# Patient Record
Sex: MALE | URBAN - METROPOLITAN AREA
[De-identification: names, ages, dates, MRNs, and addresses within clinical notes are randomized per-mention and may not be internally consistent; named-entity substitution may affect disease eponyms.]

---

## 2017-05-23 ENCOUNTER — APPOINTMENT (OUTPATIENT)
Dept: GENERAL RADIOLOGY | Facility: CLINIC | Age: 57
End: 2017-05-23
Attending: PHYSICIAN ASSISTANT
Payer: COMMERCIAL

## 2017-05-23 ENCOUNTER — HOSPITAL ENCOUNTER (EMERGENCY)
Facility: CLINIC | Age: 57
Discharge: HOME | End: 2017-05-23
Attending: PHYSICIAN ASSISTANT | Admitting: PHYSICIAN ASSISTANT
Payer: COMMERCIAL

## 2017-05-23 VITALS
BODY MASS INDEX: 24.8 KG/M2 | WEIGHT: 140 LBS | RESPIRATION RATE: 18 BRPM | TEMPERATURE: 98.8 F | HEART RATE: 93 BPM | SYSTOLIC BLOOD PRESSURE: 126 MMHG | HEIGHT: 63 IN | OXYGEN SATURATION: 97 % | DIASTOLIC BLOOD PRESSURE: 82 MMHG

## 2017-05-23 DIAGNOSIS — R91.8 OPACITY OF LUNG ON IMAGING STUDY: ICD-10-CM

## 2017-05-23 DIAGNOSIS — J18.9 PNEUMONIA OF LEFT LOWER LOBE DUE TO INFECTIOUS ORGANISM: ICD-10-CM

## 2017-05-23 DIAGNOSIS — J90 PLEURAL EFFUSION, LEFT: ICD-10-CM

## 2017-05-23 DIAGNOSIS — R79.89 ELEVATED LIVER FUNCTION TESTS: ICD-10-CM

## 2017-05-23 LAB
ALBUMIN SERPL-MCNC: 2.6 G/DL (ref 3.4–5)
ALBUMIN UR-MCNC: NEGATIVE MG/DL
ALP SERPL-CCNC: 104 U/L (ref 40–150)
ALT SERPL W P-5'-P-CCNC: 74 U/L (ref 0–70)
ANION GAP SERPL CALCULATED.3IONS-SCNC: 7 MMOL/L (ref 3–14)
APPEARANCE UR: CLEAR
AST SERPL W P-5'-P-CCNC: 57 U/L (ref 0–45)
BASOPHILS # BLD AUTO: 0.1 10E9/L (ref 0–0.2)
BASOPHILS NFR BLD AUTO: 0.4 %
BILIRUB SERPL-MCNC: 0.5 MG/DL (ref 0.2–1.3)
BILIRUB UR QL STRIP: NEGATIVE
BUN SERPL-MCNC: 13 MG/DL (ref 7–30)
CALCIUM SERPL-MCNC: 8.7 MG/DL (ref 8.5–10.1)
CHLORIDE SERPL-SCNC: 104 MMOL/L (ref 94–109)
CO2 SERPL-SCNC: 28 MMOL/L (ref 20–32)
COLOR UR AUTO: YELLOW
CREAT SERPL-MCNC: 0.82 MG/DL (ref 0.66–1.25)
DIFFERENTIAL METHOD BLD: ABNORMAL
EOSINOPHIL # BLD AUTO: 0.3 10E9/L (ref 0–0.7)
EOSINOPHIL NFR BLD AUTO: 1.9 %
ERYTHROCYTE [DISTWIDTH] IN BLOOD BY AUTOMATED COUNT: 13.2 % (ref 10–15)
GFR SERPL CREATININE-BSD FRML MDRD: ABNORMAL ML/MIN/1.7M2
GLUCOSE SERPL-MCNC: 114 MG/DL (ref 70–99)
GLUCOSE UR STRIP-MCNC: NEGATIVE MG/DL
HCT VFR BLD AUTO: 35.4 % (ref 40–53)
HGB BLD-MCNC: 11.7 G/DL (ref 13.3–17.7)
HGB UR QL STRIP: NEGATIVE
IMM GRANULOCYTES # BLD: 0.1 10E9/L (ref 0–0.4)
IMM GRANULOCYTES NFR BLD: 0.6 %
KETONES UR STRIP-MCNC: NEGATIVE MG/DL
LACTATE BLD-SCNC: 0.9 MMOL/L (ref 0.7–2.1)
LEUKOCYTE ESTERASE UR QL STRIP: NEGATIVE
LYMPHOCYTES # BLD AUTO: 1.5 10E9/L (ref 0.8–5.3)
LYMPHOCYTES NFR BLD AUTO: 10.6 %
MAGNESIUM SERPL-MCNC: 2.5 MG/DL (ref 1.6–2.3)
MCH RBC QN AUTO: 30.6 PG (ref 26.5–33)
MCHC RBC AUTO-ENTMCNC: 33.1 G/DL (ref 31.5–36.5)
MCV RBC AUTO: 93 FL (ref 78–100)
MONOCYTES # BLD AUTO: 1 10E9/L (ref 0–1.3)
MONOCYTES NFR BLD AUTO: 7.1 %
MUCOUS THREADS #/AREA URNS LPF: PRESENT /LPF
NEUTROPHILS # BLD AUTO: 11 10E9/L (ref 1.6–8.3)
NEUTROPHILS NFR BLD AUTO: 79.4 %
NITRATE UR QL: NEGATIVE
NRBC # BLD AUTO: 0 10*3/UL
NRBC BLD AUTO-RTO: 0 /100
PH UR STRIP: 6 PH (ref 5–7)
PLATELET # BLD AUTO: 488 10E9/L (ref 150–450)
POTASSIUM SERPL-SCNC: 3.3 MMOL/L (ref 3.4–5.3)
PROT SERPL-MCNC: 7 G/DL (ref 6.8–8.8)
RBC # BLD AUTO: 3.82 10E12/L (ref 4.4–5.9)
RBC #/AREA URNS AUTO: <1 /HPF (ref 0–2)
SODIUM SERPL-SCNC: 139 MMOL/L (ref 133–144)
SP GR UR STRIP: 1.01 (ref 1–1.03)
URN SPEC COLLECT METH UR: ABNORMAL
UROBILINOGEN UR STRIP-MCNC: 2 MG/DL (ref 0–2)
WBC # BLD AUTO: 13.9 10E9/L (ref 4–11)
WBC #/AREA URNS AUTO: 1 /HPF (ref 0–2)

## 2017-05-23 PROCEDURE — 99284 EMERGENCY DEPT VISIT MOD MDM: CPT

## 2017-05-23 PROCEDURE — 25000132 ZZH RX MED GY IP 250 OP 250 PS 637: Performed by: PHYSICIAN ASSISTANT

## 2017-05-23 PROCEDURE — 81001 URINALYSIS AUTO W/SCOPE: CPT | Performed by: PHYSICIAN ASSISTANT

## 2017-05-23 PROCEDURE — 96361 HYDRATE IV INFUSION ADD-ON: CPT

## 2017-05-23 PROCEDURE — 80053 COMPREHEN METABOLIC PANEL: CPT | Performed by: PHYSICIAN ASSISTANT

## 2017-05-23 PROCEDURE — 96360 HYDRATION IV INFUSION INIT: CPT

## 2017-05-23 PROCEDURE — 85025 COMPLETE CBC W/AUTO DIFF WBC: CPT | Performed by: PHYSICIAN ASSISTANT

## 2017-05-23 PROCEDURE — 83735 ASSAY OF MAGNESIUM: CPT | Performed by: PHYSICIAN ASSISTANT

## 2017-05-23 PROCEDURE — 25000128 H RX IP 250 OP 636: Performed by: PHYSICIAN ASSISTANT

## 2017-05-23 PROCEDURE — 87040 BLOOD CULTURE FOR BACTERIA: CPT | Performed by: PHYSICIAN ASSISTANT

## 2017-05-23 PROCEDURE — 71020 XR CHEST 2 VW: CPT

## 2017-05-23 PROCEDURE — 83605 ASSAY OF LACTIC ACID: CPT | Performed by: PHYSICIAN ASSISTANT

## 2017-05-23 RX ORDER — POTASSIUM CHLORIDE 1500 MG/1
20 TABLET, EXTENDED RELEASE ORAL ONCE
Status: COMPLETED | OUTPATIENT
Start: 2017-05-23 | End: 2017-05-23

## 2017-05-23 RX ORDER — AZITHROMYCIN 250 MG/1
TABLET, FILM COATED ORAL
Qty: 6 TABLET | Refills: 0 | Status: SHIPPED | OUTPATIENT
Start: 2017-05-23 | End: 2017-05-28

## 2017-05-23 RX ADMIN — POTASSIUM CHLORIDE 20 MEQ: 1500 TABLET, EXTENDED RELEASE ORAL at 12:16

## 2017-05-23 RX ADMIN — SODIUM CHLORIDE 1000 ML: 9 INJECTION, SOLUTION INTRAVENOUS at 11:09

## 2017-05-23 ASSESSMENT — ENCOUNTER SYMPTOMS
FEVER: 1
SHORTNESS OF BREATH: 1
COUGH: 1
ABDOMINAL PAIN: 0

## 2017-05-23 NOTE — ED AVS SNAPSHOT
[facility] Emergency Department    [address]    [facility] [address]    Phone:  [phone]    Fax:  [phone]                                       Kym Chua   MRN: [id number]    Department:  [facility] Emergency Department   Date of Visit:  5/23/2017           After Visit Summary Signature Page     I have received my discharge instructions, and my questions have been answered. I have discussed any challenges I see with this plan with the nurse or doctor.    ..........................................................................................................................................  Patient/Patient Representative Signature      ..........................................................................................................................................  Patient Representative Print Name and Relationship to Patient    ..................................................               ................................................  Date                                            Time    ..........................................................................................................................................  Reviewed by Signature/Title    ...................................................              ..............................................  Date                                                            Time

## 2017-05-23 NOTE — ED NOTES
Pt educated on Potassium, pt resting in cot, respirations equal and unlabored. Updated on POC. Denies needs

## 2017-05-23 NOTE — ED AVS SNAPSHOT
[facility] Emergency Department    [address]    [facility] [address]    Phone:  [phone]    Fax:  [phone]                                       Kym Chua   MRN: [id number]    Department:  [facility] Emergency Department   Date of Visit:  5/23/2017           Patient Information     Date Of Birth          1960        Your diagnoses for this visit were:     Pneumonia of left lower lobe due to infectious organism     Pleural effusion, left, small     Opacity of lung on imaging study, right lung and left mid lung     Elevated liver function tests        You were seen by Subha Lyn PA-C.      Follow-up Information     Follow up with [facility] Emergency Department.    Specialty:  EMERGENCY MEDICINE    Why:  If symptoms worsen    Contact information:    [address]  [facility] [address] [phone]        Follow up with Yadi Gómez In 2 days.    Specialty:  Family Practice    Contact information:    [facility]  [address] [facility] [address]  [phone]          Discharge Instructions       Rest, switch antibiotic to azithromycin.   Continue tylenol and ibuprofen for pain and fevers.   Follow up in 2 days with primary care to ensure improving.   Return if worsening symptoms, persistent high fevers not relieved by tylenol/ibuprofen, not able to tolerate oral meds, or any other new concerns.     *recheck liver function tests with primary care.   *repeat CXR with primary care provider to monitor opacities in lungs.       Discharge Instructions  Bronchitis, Pneumonia, Bronchospasm    You were seen today for a chest infection or inflammation. If your doctor decided this was due to a bacterial infection, you may need an antibiotic. Sometimes these are caused by a virus, and then an antibiotic will not help.     Return to the Emergency Department if:    Your breathing gets much worse.    You are very  "weak, or feel much more ill.    You develop new symptoms, such as chest pain.    You cough up blood.    You are vomiting enough that you can t keep fluids or your medicine down.    What can I do to help myself?    Fill any prescriptions the doctor gave you and take them right away--especially antibiotics. Be sure to finish the whole antibiotic prescription.    You may be given a prescription for an inhaler, which can help loosen tight air passages.  Use this as needed, but not more often than directed. Inhalers work much better when used with a spacer.     You may be given a prescription for a steroid to reduce inflammation. Used long-term, these can have many serious side effects, but for short courses these do not happen. You may notice restlessness or increased appetite.        You may use non-prescription cough or cold medicines. Cough medicines may help, but don t make the cough go away completely.     Avoid smoke, because this can make your symptoms worse. If you smoke, this may be a good time to quit! Consider using nicotine lozenges, gum, or patches to reduce cravings.     If you have a fever, Tylenol  (acetaminophen), Motrin  (ibuprofen), or Advil  (ibuprofen) may help bring fever down and may help you feel more comfortable. Be sure to read and follow the package directions, and ask your doctor if you have questions.    Be sure to get your flu shot each year.  The pneumonia shot can help prevent pneumonia.  Probiotics: If you have been given an antibiotic, you may want to also take a probiotic pill or eat yogurt with live cultures. Probiotics have \"good bacteria\" to help your intestines stay healthy. Studies have shown that probiotics help prevent diarrhea and other intestine problems (including C. diff infection) when you take antibiotics. You can buy these without a prescription in the pharmacy section of the store.     If your doctor has told you to follow-up at your clinic, be sure to call right away " and go to your appointment.  If there is any problem with keeping your appointment, call your doctor or return to the Emergency Department.    If you were given a prescription for medicine here today, be sure to read all of the information (including the package insert) that comes with your prescription.  This will include important information about the medicine, its side effects, and any warnings that you need to know about.  The pharmacist who fills the prescription can provide more information and answer questions you may have about the medicine.  If you have questions or concerns that the pharmacist cannot address, please call or return to the Emergency Department.       Remember that you can always come back to the Emergency Department if you are not able to see your regular doctor in the amount of time listed above, if you get any new symptoms, or if there is anything that worries you.          24 Hour Appointment Hotline       To make an appointment at any [facility], call [phone] ([phone]). If you don't have a family doctor or clinic, we will help you find one. [facility] are conveniently located to serve the needs of you and your family.             Review of your medicines      START taking        Dose / Directions Last dose taken    azithromycin 250 MG tablet   Commonly known as:  ZITHROMAX Z-JEANNE   Quantity:  6 tablet        Two tablets on the first day, then one tablet daily for the next 4 days   Refills:  0          Our records show that you are taking the medicines listed below. If these are incorrect, please call your family doctor or clinic.        Dose / Directions Last dose taken    CIPRODEX otic suspension   Quantity:  1   Generic drug:  ciprofloxacin-dexamethasone        3 drops twice a day for 7 days   Refills:  0        NAPROSYN 500 MG tablet   Quantity:  60   Generic drug:  naproxen        ONE TABLET TWICE DAILY WITH FOOD   Refills:  0        SUDAFED PO        AS  NEEDED   Refills:  0                Prescriptions were sent or printed at these locations (1 Prescription)                   Other Prescriptions                Printed at Department/Unit printer (1 of 1)         azithromycin (ZITHROMAX Z-JEANNE) 250 MG tablet                Procedures and tests performed during your visit     Blood culture    CBC + differential    Chest XR,  PA & LAT    Comprehensive metabolic panel    IV access    Lactic acid whole blood    Magnesium    UA with Microscopic      Orders Needing Specimen Collection     None      Pending Results     Date and Time Order Name Status Description    5/23/2017 1057 UA with Microscopic In process     5/23/2017 1057 Blood culture In process             Pending Culture Results     Date and Time Order Name Status Description    5/23/2017 1057 UA with Microscopic In process     5/23/2017 1057 Blood culture In process             Pending Results Instructions     If you had any lab results that were not finalized at the time of your Discharge, you can call the ED Lab Result RN at [phone]. You will be contacted by this team for any positive Lab results or changes in treatment. The nurses are available 7 days a week from 10A to 6:30P.  You can leave a message 24 hours per day and they will return your call.        Test Results From Your Hospital Stay        5/23/2017 11:28 AM      Component Results     Component Value Ref Range & Units Status    WBC 13.9 (H) 4.0 - 11.0 10e9/L Final    RBC Count 3.82 (L) 4.4 - 5.9 10e12/L Final    Hemoglobin 11.7 (L) 13.3 - 17.7 g/dL Final    Hematocrit 35.4 (L) 40.0 - 53.0 % Final    MCV 93 78 - 100 fl Final    MCH 30.6 26.5 - 33.0 pg Final    MCHC 33.1 31.5 - 36.5 g/dL Final    RDW 13.2 10.0 - 15.0 % Final    Platelet Count 488 (H) 150 - 450 10e9/L Final    Diff Method Automated Method  Final    % Neutrophils 79.4 % Final    % Lymphocytes 10.6 % Final    % Monocytes 7.1 % Final    % Eosinophils 1.9 % Final    % Basophils 0.4 %  Final    % Immature Granulocytes 0.6 % Final    Nucleated RBCs 0 0 /100 Final    Absolute Neutrophil 11.0 (H) 1.6 - 8.3 10e9/L Final    Absolute Lymphocytes 1.5 0.8 - 5.3 10e9/L Final    Absolute Monocytes 1.0 0.0 - 1.3 10e9/L Final    Absolute Eosinophils 0.3 0.0 - 0.7 10e9/L Final    Absolute Basophils 0.1 0.0 - 0.2 10e9/L Final    Abs Immature Granulocytes 0.1 0 - 0.4 10e9/L Final    Absolute Nucleated RBC 0.0  Final         5/23/2017 11:47 AM      Component Results     Component Value Ref Range & Units Status    Sodium 139 133 - 144 mmol/L Final    Potassium 3.3 (L) 3.4 - 5.3 mmol/L Final    Chloride 104 94 - 109 mmol/L Final    Carbon Dioxide 28 20 - 32 mmol/L Final    Anion Gap 7 3 - 14 mmol/L Final    Glucose 114 (H) 70 - 99 mg/dL Final    Urea Nitrogen 13 7 - 30 mg/dL Final    Creatinine 0.82 0.66 - 1.25 mg/dL Final    GFR Estimate >90  Non  GFR Calc   >60 mL/min/1.7m2 Final    GFR Estimate If Black >90   GFR Calc   >60 mL/min/1.7m2 Final    Calcium 8.7 8.5 - 10.1 mg/dL Final    Bilirubin Total 0.5 0.2 - 1.3 mg/dL Final    Albumin 2.6 (L) 3.4 - 5.0 g/dL Final    Protein Total 7.0 6.8 - 8.8 g/dL Final    Alkaline Phosphatase 104 40 - 150 U/L Final    ALT 74 (H) 0 - 70 U/L Final    AST 57 (H) 0 - 45 U/L Final         5/23/2017 11:45 AM      Component Results     Component Value Ref Range & Units Status    Lactic Acid 0.9 0.7 - 2.1 mmol/L Final         5/23/2017 11:39 AM      Narrative     XR CHEST 2 VW 5/23/2017 11:34 AM    COMPARISON: None.    HISTORY: Pneumonia, worsening symptoms.        Impression     IMPRESSION: Small left pleural effusion with associated  atelectasis/consolidation at the left base. Additionally, nodular  airspace opacity at the right base and also in the left midlung.  Recommend follow-up to resolution. No pneumothorax on either side.    JENI SOTOMAYOR         5/23/2017 11:25 AM         5/23/2017 12:31 PM         5/23/2017 12:21 PM      Component Results      Component Value Ref Range & Units Status    Magnesium 2.5 (H) 1.6 - 2.3 mg/dL Final                Clinical Quality Measure: Blood Pressure Screening     Your blood pressure was checked while you were in the emergency department today. The last reading we obtained was  BP: 126/82 . Please read the guidelines below about what these numbers mean and what you should do about them.  If your systolic blood pressure (the top number) is less than 120 and your diastolic blood pressure (the bottom number) is less than 80, then your blood pressure is normal. There is nothing more that you need to do about it.  If your systolic blood pressure (the top number) is 120-139 or your diastolic blood pressure (the bottom number) is 80-89, your blood pressure may be higher than it should be. You should have your blood pressure rechecked within a year by a primary care provider.  If your systolic blood pressure (the top number) is 140 or greater or your diastolic blood pressure (the bottom number) is 90 or greater, you may have high blood pressure. High blood pressure is treatable, but if left untreated over time it can put you at risk for heart attack, stroke, or kidney failure. You should have your blood pressure rechecked by a primary care provider within the next 4 weeks.  If your provider in the emergency department today gave you specific instructions to follow-up with your doctor or provider even sooner than that, you should follow that instruction and not wait for up to 4 weeks for your follow-up visit.        Thank you for choosing [facility]       Thank you for choosing [facility] for your care. Our goal is always to provide you with excellent care. Hearing back from our patients is one way we can continue to improve our services. Please take a few minutes to complete the written survey that you may receive in the mail after you visit with us. Thank you!        [facility] Information     [facility] lets you send messages to your  "doctor, view your test results, renew your prescriptions, schedule appointments and more. To sign up, go to [email or web address] . Click on \"Log in\" on the left side of the screen, which will take you to the Welcome page. Then click on \"Sign up Now\" on the right side of the page.     You will be asked to enter the access code listed below, as well as some personal information. Please follow the directions to create your username and password.     Your access code is: [email or web address]  Expires: 2017  1:02 PM     Your access code will  in 90 days. If you need help or a new code, please call your [facility] clinic or [phone].        Care EveryWhere ID     This is your Care EveryWhere ID. This could be used by other organizations to access your [facility] medical records  [id number]        After Visit Summary       This is your record. Keep this with you and show to your community pharmacist(s) and doctor(s) at your next visit.                  "

## 2017-05-23 NOTE — DISCHARGE INSTRUCTIONS
Rest, switch antibiotic to azithromycin.   Continue tylenol and ibuprofen for pain and fevers.   Follow up in 2 days with primary care to ensure improving.   Return if worsening symptoms, persistent high fevers not relieved by tylenol/ibuprofen, not able to tolerate oral meds, or any other new concerns.     *recheck liver function tests with primary care.   *repeat CXR with primary care provider to monitor opacities in lungs.       Discharge Instructions  Bronchitis, Pneumonia, Bronchospasm    You were seen today for a chest infection or inflammation. If your doctor decided this was due to a bacterial infection, you may need an antibiotic. Sometimes these are caused by a virus, and then an antibiotic will not help.     Return to the Emergency Department if:    Your breathing gets much worse.    You are very weak, or feel much more ill.    You develop new symptoms, such as chest pain.    You cough up blood.    You are vomiting enough that you can t keep fluids or your medicine down.    What can I do to help myself?    Fill any prescriptions the doctor gave you and take them right away--especially antibiotics. Be sure to finish the whole antibiotic prescription.    You may be given a prescription for an inhaler, which can help loosen tight air passages.  Use this as needed, but not more often than directed. Inhalers work much better when used with a spacer.     You may be given a prescription for a steroid to reduce inflammation. Used long-term, these can have many serious side effects, but for short courses these do not happen. You may notice restlessness or increased appetite.        You may use non-prescription cough or cold medicines. Cough medicines may help, but don t make the cough go away completely.     Avoid smoke, because this can make your symptoms worse. If you smoke, this may be a good time to quit! Consider using nicotine lozenges, gum, or patches to reduce cravings.     If you have a fever, Tylenol   "(acetaminophen), Motrin  (ibuprofen), or Advil  (ibuprofen) may help bring fever down and may help you feel more comfortable. Be sure to read and follow the package directions, and ask your doctor if you have questions.    Be sure to get your flu shot each year.  The pneumonia shot can help prevent pneumonia.  Probiotics: If you have been given an antibiotic, you may want to also take a probiotic pill or eat yogurt with live cultures. Probiotics have \"good bacteria\" to help your intestines stay healthy. Studies have shown that probiotics help prevent diarrhea and other intestine problems (including C. diff infection) when you take antibiotics. You can buy these without a prescription in the pharmacy section of the store.     If your doctor has told you to follow-up at your clinic, be sure to call right away and go to your appointment.  If there is any problem with keeping your appointment, call your doctor or return to the Emergency Department.    If you were given a prescription for medicine here today, be sure to read all of the information (including the package insert) that comes with your prescription.  This will include important information about the medicine, its side effects, and any warnings that you need to know about.  The pharmacist who fills the prescription can provide more information and answer questions you may have about the medicine.  If you have questions or concerns that the pharmacist cannot address, please call or return to the Emergency Department.       Remember that you can always come back to the Emergency Department if you are not able to see your regular doctor in the amount of time listed above, if you get any new symptoms, or if there is anything that worries you.        "

## 2017-05-23 NOTE — ED NOTES
Pt educated on NS. Pt resting in cot with monitors, respirations equal and unlabored. Pt and family updated on POC

## 2017-05-23 NOTE — ED NOTES
Pt back from radiology via cart. Hooked back up to BP and pulse ox. Side rails x2, call light within reach and family at bedside.  Pt unable to provide UA sample at this time.

## 2017-05-23 NOTE — ED NOTES
D/c instructions reviewed with pt educated on follow-up in two days, given prescription for Zithromax.

## 2017-05-29 LAB
BACTERIA SPEC CULT: NO GROWTH
Lab: NORMAL
MICRO REPORT STATUS: NORMAL
SPECIMEN SOURCE: NORMAL

## 2017-06-01 ENCOUNTER — TRANSFERRED RECORDS (OUTPATIENT)
Dept: HEALTH INFORMATION MANAGEMENT | Facility: CLINIC | Age: 57
End: 2017-06-01

## 2019-02-14 NOTE — ED PROVIDER NOTES
History     Chief Complaint:  Cough and shortness of breath     CARA Chua is a 56 year old male who presents with on going cough and shortness of breath. Patient was diagnosed with pneumonia 4 days ago at [facility] and was discharged with Levaquin. Following his visit, the patient's symptoms did not improve as his cough and shortness of breath still persists. The patient also developed a fever of 102F which prompted him to visit the ED today. He has been taking his Levaquin as prescribed. He denies any outright chest pain with his symptoms nor any nausea, vomiting, abdominal pain, myalgias, leg swelling or calf pain.     CARDIAC RISK FACTORS:  Sex:    Male  Tobacco:   Yes  Hypertension:   No  Hyperlipidemia:  No  Diabetes:   No  Family History:     Allergies:  No known drug allergies      Medications:    Sudafed  Levaquin     Past Medical History:    Hyperlipidemia    Past Surgical History:    History reviewed. No pertinent surgical history.     Family History:    Diabetes - mother, sister, father  Hypertension - mother    Social History:  Smoking status: Everyday smoker 1/2 pack a day  Alcohol use: 2 drinks weekly   Marital Status:        Review of Systems   Constitutional: Positive for fever.   Respiratory: Positive for cough and shortness of breath.    Cardiovascular: Negative for chest pain.   Gastrointestinal: Negative for abdominal pain.   All other systems reviewed and are negative.      Physical Exam     Patient Vitals for the past 24 hrs:   BP Temp Temp src Pulse Resp SpO2 Height Weight   05/23/17 1310 - - - - 18 - - -   05/23/17 1300 126/82 - - - - 97 % - -   05/23/17 1245 129/81 - - - - 97 % - -   05/23/17 1230 121/74 - - - - 97 % - -   05/23/17 1215 121/71 - - - - 97 % - -   05/23/17 1200 118/76 - - - - 98 % - -   05/23/17 1145 120/75 - - - - - - -   05/23/17 1132 - - - - - 98 % - -   05/23/17 1115 121/72 - - - - 98 % - -   05/23/17 1100 - - - - - 96 % - -   05/23/17 1056 135/78  "98.8  F (37.1  C) Oral 93 18 96 % 1.6 m (5' 3\") 63.5 kg (140 lb)      Physical Exam  Nursing note and vitals reviewed.     GENERAL: Alert, mild distress, non toxic appearing.   HEENT: Normal conjunctiva. No scleral icterus. MMM. No oropharyngeal erythema, edema or exudate.   NECK: Supple. No nuchal rigidity.   CARDIAC: Normal rate and regular rhythm. Normal heart sounds. No murmurs, rubs, or gallops appreciated.  PULMONARY: Faint crackles in left lower lung base. Normal breath sounds. No wheezing or rhonchi appreciated. No stridor. No accessory muscle usage. Speaking full sentences without difficulty.   ABDOMEN: Soft, non distended abdomen. Non-tender. No rebound or guarding.   NEURO: Alert and oriented. Non-focal.   MUSCULOSKELETAL: Normal range of motion. No peripheral edema.   SKIN: Skin is warm and dry. No rashes. No pallor or jaundice.   PSYCH: Normal affect and mood.       Emergency Department Course     Imaging:  Radiographic findings were communicated with the patient who voiced understanding of the findings.    X-ray Chest, 2 views:  Small left pleural effusion with associated  atelectasis/consolidation at the left base. Additionally, nodular  airspace opacity at the right base and also in the left midlung.  Recommend follow-up to resolution. No pneumothorax on either side.  Result per radiology.     Laboratory:  UA: Mucous, o/w Negative  CBC: WBC 13.9(H), HGB 11.7(L), (H) o/w WNL  CMP: Potassium 3.3(L), glucose 114(H), albumin 2.6(L), ALT 74(H), AST 57(H) o/w WNL (Creatinine 0.82)  Lactic acid: 0.9  Magnesium: 2.5(H)  Blood culture: pending    X-ray Chest, PA & LA  Small left pleural effusion with associated  atelectasis/consolidation at the left base. Additionally, nodular  airspace opacity at the right base and also in the left midlung.  Recommend follow-up to resolution. No pneumothorax on either side.  Result per radiology.     Interventions:  1109 - NS 1L IV Bolus   1216 - K-dur 20 mEq PO "     Emergency Department Course:  Past medical records, nursing notes, and vitals reviewed.  1058: I performed an exam of the patient and obtained history, as documented above.   IV inserted and blood drawn.   The patient was sent for a X-ray while in the emergency department, findings above.   1249: I rechecked the patient. Findings and plan explained to the Patient. Patient discharged home with instructions regarding supportive care, medications, and reasons to return. The importance of close follow-up was reviewed.        Impression & Plan      Medical Decision Making:  Kym Chua is a 56 year old male who present with family member for evaluation for continued cough, fever, and shortness of breath.. He was seen in office by  5/19/20176 and diagnosed with left lower lobe pneumonia with small effusion and treated with Levaquin. He has been taking antibiotics as prescribed but has been persistently had symptoms prompting arrival here. Patient is afebrile, hemodynamically, and nontoxic appearing. He has no sign of respiratory distress. No signs of significant dehydration. A repeat chest-x-ray today confirms left lower lobe infiltrate with small pleural effusion. His clinical history is consistent with this. I did consider other etiologies of the effusion, including PE though less likely given patient is not tachycardic or hypoxic and has no significant PE risk factors apart from intermittent tobacco use. He also has some nodules in the left and right lung which were previously noted on the chest x-ray from clinic and patient and family are aware of this. A follow up CT was recommended by PCP for further evaluation as the following resolution his pneumonia. White count elevated at 13.9.This is slightly improved from 15 during his previous visit. Hemoglobin is low at 11.7 but stable from prior. LFTs slightly elevated but he has no focal right upper quadrant tenderness concerning for acute hepatobiliary disease.  This could be transiently elevated related to his infection. I advised him to have this rechecked by PCP. Lactic acid is within normal limit. Blood culture x 1 pending. He is not tachycardic. Blood pressure stable. There are no signs of severe sepsis or septic shock.     I discussed admission versus outpatient management with close PCP follow up and patient and family preferred the latter. I felt this was reasonable, as his labs are all reassuring, he is not hypoxic and has no other signs concerning for respiratory distress or significant toxicity. Given he has not had a significant improvement on Levaquin, I did switch his antibiotic to Azithromycin. Advised him to continue ibuprofen and tylenol for fevers/body aches and reviewed the importance of close follow up in 2 days to ensure improving with PCP. Reviewed reasons to return to ED, including worsening symptoms, persistent high fevers not relieve with antipyretics, unable to tolerate oral intake/antibiotic, leg swelling, or any new concerns. The patient was in agreement with plan and discharged in satisfactory condition with all questions answered.      Diagnosis:    ICD-10-CM   1. Pneumonia of left lower lobe due to infectious organism J18.9       2. Pleural effusion, left, small J90   3. Opacity of lung on imaging study, right lung and left mid lung R91.8   4. Elevated liver function tests R79.89     Discharged to Home    Discharge Medications:   Details   azithromycin (ZITHROMAX Z-JEANNE) 250 MG tablet Two tablets on the first day, then one tablet daily for the next 4 days, Disp-6 tablet, R-0, Local Print          Priyank Sherman  5/23/2017   [facility] EMERGENCY DEPARTMENT    I, Priyank Sherman, am serving as a scribe at 10:58 AM on 5/23/2017 to document services personally performed by Subha Lyn PA-C based on my observations and the provider's statements to me.       Subha Lyn PA-C  05/23/17 7290     none